# Patient Record
Sex: MALE | Race: WHITE | URBAN - METROPOLITAN AREA
[De-identification: names, ages, dates, MRNs, and addresses within clinical notes are randomized per-mention and may not be internally consistent; named-entity substitution may affect disease eponyms.]

---

## 2019-06-21 ENCOUNTER — THERAPY VISIT (OUTPATIENT)
Dept: PHYSICAL THERAPY | Facility: CLINIC | Age: 29
End: 2019-06-21
Payer: COMMERCIAL

## 2019-06-21 DIAGNOSIS — M54.50 ACUTE BILATERAL LOW BACK PAIN WITHOUT SCIATICA: ICD-10-CM

## 2019-06-21 PROCEDURE — 97112 NEUROMUSCULAR REEDUCATION: CPT | Mod: GP | Performed by: PHYSICAL THERAPIST

## 2019-06-21 PROCEDURE — 97161 PT EVAL LOW COMPLEX 20 MIN: CPT | Mod: GP | Performed by: PHYSICAL THERAPIST

## 2019-06-21 PROCEDURE — 97110 THERAPEUTIC EXERCISES: CPT | Mod: GP | Performed by: PHYSICAL THERAPIST
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
## 2019-06-21 NOTE — PROGRESS NOTES
[facility] Initial Evaluation  Subjective:       Pertinent medical history includes:  Smoking.    Surgeries include:  None.  Current medications:  None.   Primary job tasks include:  Driving, lifting/carrying, prolonged standing, pushing/pulling and repetitive tasks.           Patient is .         Oswestry Score: 22 %                 Objective:  System    Physical Exam    General     ROS    Assessment/Plan:

## 2019-07-16 ENCOUNTER — THERAPY VISIT (OUTPATIENT)
Dept: PHYSICAL THERAPY | Facility: CLINIC | Age: 29
End: 2019-07-16
Payer: COMMERCIAL

## 2019-07-16 DIAGNOSIS — M54.50 ACUTE BILATERAL LOW BACK PAIN WITHOUT SCIATICA: ICD-10-CM

## 2019-07-16 PROCEDURE — 97112 NEUROMUSCULAR REEDUCATION: CPT | Mod: GP | Performed by: PHYSICAL THERAPIST

## 2019-07-16 PROCEDURE — 97110 THERAPEUTIC EXERCISES: CPT | Mod: GP | Performed by: PHYSICAL THERAPIST
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
## 2019-08-05 ENCOUNTER — THERAPY VISIT (OUTPATIENT)
Dept: PHYSICAL THERAPY | Facility: CLINIC | Age: 29
End: 2019-08-05
Payer: COMMERCIAL

## 2019-08-05 DIAGNOSIS — M54.50 ACUTE BILATERAL LOW BACK PAIN WITHOUT SCIATICA: ICD-10-CM

## 2019-08-05 PROCEDURE — 97112 NEUROMUSCULAR REEDUCATION: CPT | Mod: GP | Performed by: PHYSICAL THERAPIST

## 2019-08-05 PROCEDURE — 97110 THERAPEUTIC EXERCISES: CPT | Mod: GP | Performed by: PHYSICAL THERAPIST

## 2019-09-19 PROBLEM — M54.50 ACUTE BILATERAL LOW BACK PAIN WITHOUT SCIATICA: Status: RESOLVED | Noted: 2019-06-21 | Resolved: 2019-09-19

## 2025-01-07 ENCOUNTER — OFFICE VISIT (OUTPATIENT)
Dept: URGENT CARE | Facility: URGENT CARE | Age: 35
End: 2025-01-07
Payer: COMMERCIAL

## 2025-01-07 VITALS
WEIGHT: 240 LBS | RESPIRATION RATE: 16 BRPM | SYSTOLIC BLOOD PRESSURE: 113 MMHG | DIASTOLIC BLOOD PRESSURE: 78 MMHG | OXYGEN SATURATION: 96 % | TEMPERATURE: 97.7 F | HEART RATE: 79 BPM

## 2025-01-07 DIAGNOSIS — M54.41 ACUTE RIGHT-SIDED LOW BACK PAIN WITH RIGHT-SIDED SCIATICA: Primary | ICD-10-CM

## 2025-01-07 PROCEDURE — 99203 OFFICE O/P NEW LOW 30 MIN: CPT | Performed by: PHYSICIAN ASSISTANT

## 2025-01-07 RX ORDER — METHOCARBAMOL 500 MG/1
500 TABLET, FILM COATED ORAL 4 TIMES DAILY PRN
Qty: 15 TABLET | Refills: 0 | Status: SHIPPED | OUTPATIENT
Start: 2025-01-07

## 2025-01-07 RX ORDER — PREDNISONE 20 MG/1
40 TABLET ORAL DAILY
Qty: 10 TABLET | Refills: 0 | Status: SHIPPED | OUTPATIENT
Start: 2025-01-07 | End: 2025-01-12

## 2025-01-07 ASSESSMENT — ENCOUNTER SYMPTOMS
BACK PAIN: 1
JOINT SWELLING: 0
COLOR CHANGE: 0
ARTHRALGIAS: 1
CHILLS: 0
FATIGUE: 0
NECK PAIN: 0
FEVER: 0
NECK STIFFNESS: 0
MYALGIAS: 1
CARDIOVASCULAR NEGATIVE: 1
WOUND: 0
PALPITATIONS: 0
NUMBNESS: 0

## 2025-01-07 ASSESSMENT — PAIN SCALES - GENERAL: PAINLEVEL_OUTOF10: EXTREME PAIN (8)

## 2025-01-08 ENCOUNTER — PATIENT OUTREACH (OUTPATIENT)
Dept: CARE COORDINATION | Facility: CLINIC | Age: 35
End: 2025-01-08
Payer: COMMERCIAL
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
## 2025-01-21 ENCOUNTER — OFFICE VISIT (OUTPATIENT)
Dept: PHYSICAL MEDICINE AND REHAB | Facility: CLINIC | Age: 35
End: 2025-01-21
Attending: PHYSICIAN ASSISTANT
Payer: COMMERCIAL

## 2025-01-21 VITALS
WEIGHT: 240 LBS | SYSTOLIC BLOOD PRESSURE: 137 MMHG | BODY MASS INDEX: 29.84 KG/M2 | HEIGHT: 75 IN | HEART RATE: 81 BPM | DIASTOLIC BLOOD PRESSURE: 84 MMHG

## 2025-01-21 DIAGNOSIS — M54.41 ACUTE RIGHT-SIDED LOW BACK PAIN WITH RIGHT-SIDED SCIATICA: Primary | ICD-10-CM

## 2025-01-21 DIAGNOSIS — M54.16 RIGHT LUMBAR RADICULOPATHY: ICD-10-CM

## 2025-01-21 DIAGNOSIS — G89.29 CHRONIC BILATERAL LOW BACK PAIN WITH BILATERAL SCIATICA: ICD-10-CM

## 2025-01-21 DIAGNOSIS — M54.41 CHRONIC BILATERAL LOW BACK PAIN WITH BILATERAL SCIATICA: ICD-10-CM

## 2025-01-21 DIAGNOSIS — M51.362 DEGENERATION OF INTERVERTEBRAL DISC OF LUMBAR REGION WITH DISCOGENIC BACK PAIN AND LOWER EXTREMITY PAIN: ICD-10-CM

## 2025-01-21 DIAGNOSIS — M54.42 CHRONIC BILATERAL LOW BACK PAIN WITH BILATERAL SCIATICA: ICD-10-CM

## 2025-01-21 PROCEDURE — 99204 OFFICE O/P NEW MOD 45 MIN: CPT | Performed by: NURSE PRACTITIONER

## 2025-01-21 RX ORDER — NABUMETONE 500 MG/1
500-1000 TABLET, FILM COATED ORAL 2 TIMES DAILY PRN
Qty: 30 TABLET | Refills: 3 | Status: SHIPPED | OUTPATIENT
Start: 2025-01-21

## 2025-01-21 ASSESSMENT — PAIN SCALES - GENERAL: PAINLEVEL_OUTOF10: MILD PAIN (2)

## 2025-01-21 NOTE — PROGRESS NOTES
ASSESSMENT: Jewel Ott is a 34 year old male who presents for consultation at the request of PCP Clinic - [facility], who presents today for new patient evaluation of:    -Ongoing right low back pain radiating to the right lower extremity L5 and S1 dermatomal pattern after lifting injury 1/6/2025.    -History of chronic low back pain with leg pain, history of conservative treatment with physical therapy and epidurals in the past.      -Perceived bilateral hand intermittent weakness.  Reflexes upper and lower extremities within normal limits and negative Doris reflex.    Patient is neurologically intact on exam. No myelopathic or red flag symptoms.         8/5/2019     7:00 AM   OSWESTRY DISABILITY INDEX   Count 10   Sum 5   Oswestry Score (%) 10 %            Diagnoses and all orders for this visit:  Right lumbar radiculopathy  -     nabumetone (RELAFEN) 500 MG tablet; Take 1-2 tablets (500-1,000 mg) by mouth 2 times daily as needed for moderate pain. Take with food.  Acute right-sided low back pain with right-sided sciatica  -     Spine  Referral    PLAN:  Reviewed spine anatomy and disease process. Discussed diagnosis and treatment options with the patient today. A shared decision making model was used.  The patient's values and choices were respected. The following represents what was discussed and decided upon by the provider and the patient.      -DIAGNOSTIC TESTS:  Images were personally reviewed and interpreted and explained to patient today using spine model.   -- Ordered updated lumbar spine MRI to further evaluate acute right lumbar radiculopathy.  No benefit with oral Medrol Dosepak/methocarbamol/chiropractic care.  --CT abdomen pelvis 8/13/2024    -PHYSICAL THERAPY: Referral to physical therapy placed to address home exercises for lumbar core strengthening and nerve glides.  Discussed the importance of core strengthening, ROM, stretching exercises with the patient  and how each of these entities is important in decreasing pain.  Explained to the patient that the purpose of physical therapy is to teach the patient a home exercise program.  These exercises need to be performed every day in order to decrease pain and prevent future occurrences of pain.        -MEDICATIONS: Prescribe nabumetone 500 mg 1 to 2 tablets twice daily for pain and inflammation.  Advised patient to avoid OTC NSAIDs while taking this medication and take it with a full glass of water and food.  Did offer another muscle relaxant, patient deferred this today.  Discussed multiple medication options today with patient. Discussed risks, side effects, and proper use of medications. Patient verbalized understanding.    -INTERVENTIONS: Discussed the possibility of potential RIGHT lumbar HERBERT pending imaging review which he would be open to.  Discussed risks and benefits of injections with patient today.    -PATIENT EDUCATION:  Total time of 46 minutes, on the day of service, spent with the patient, reviewing the chart, placing orders, and documenting.     -FOLLOW-UP:   Follow-up nurse call for imaging results and potential injection plan if medication/PT is not helping    Advised patient to call the Spine Center if symptoms worsen or you have problems controlling bladder and bowel function.   ______________________________________________________________________    SUBJECTIVE:  HPI:  Jewel Ott  Is a 34 year old male who presents today for new patient evaluation of low back pain right lower lumbar spine that radiates to the right buttock and into posterior lateral thigh mostly that stops at the knee.  Acute onset started after lifting a refrigerator 1/6/2025 at work, he is self-employed.  Since then pain has been quite bothersome, slight improvement with oral Medrol Dosepak.  He currently denies any episodes of his legs giving out on him.  Denies recent trips or falls or balance changes.    He does report  history of neck pain that is very minimal and tolerable at this time.  Reports occasional issues with dropping objects in his hands which he wanted to bring up today as well.  Is not his biggest concern but something that he has been frustrated by.  Discussed that we could consider cervical MRI down the road, he would like to hold off on this currently.    -Treatment to Date: No prior spinal surgery.  Recent chiropractic care January 2024 with minimal lasting benefit, no aggravation  Physical therapy years ago, nothing recent    History of injections at [facility] pain clinic years ago.    -Medications:  Methocarbamol without benefit    Medrol Dosepak 1/7/2025, with mild benefit only    Current Outpatient Medications   Medication Sig Dispense Refill    nabumetone (RELAFEN) 500 MG tablet Take 1-2 tablets (500-1,000 mg) by mouth 2 times daily as needed for moderate pain. Take with food. 30 tablet 3    methocarbamol (ROBAXIN) 500 MG tablet Take 1 tablet (500 mg) by mouth 4 times daily as needed for muscle spasms. (Patient not taking: Reported on 1/21/2025) 15 tablet 0     No current facility-administered medications for this visit.       No Known Allergies    No past medical history on file.     Patient Active Problem List   Diagnosis   (none) - all problems resolved or deleted       No past surgical history on file.    No family history on file.    Reviewed past medical, surgical, and family history with patient found on new patient intake packet located in EMR Media tab.     SOCIAL HX: Patient is , works as a  but is self-employed.  Patient does report tobacco use but only occasionally.  Reports occasional beer/alcohol use.  Denies history being a heavy drinker.  Denies recreational drug use.    ROS: Positive for sciatica.  Specifically negative for bowel/bladder dysfunction, balance changes, headache, dizziness, foot drop, fevers, chills, appetite changes, nausea/vomiting, unexplained weight loss.  "Otherwise 13 systems reviewed are negative. Please see the patient's intake questionnaire from today for details.    OBJECTIVE:  /84 (BP Location: Left arm, Patient Position: Sitting, Cuff Size: Adult Regular)   Pulse 81   Ht 6' 3\" (1.905 m)   Wt 240 lb (108.9 kg)   BMI 30.00 kg/m      PHYSICAL EXAMINATION:    --CONSTITUTIONAL:  Vital signs as above.  No acute distress.  The patient is well nourished and well groomed.  --PSYCHIATRIC:  Appropriate mood and affect. The patient is awake, alert, oriented to person, place, time and answering questions appropriately with clear speech.    --SKIN:  Skin over the face, bilateral lower extremities, and posterior torso is clean, dry, intact without rashes.    --RESPIRATORY: Normal rhythm and effort. No abnormal accessory muscle breathing patterns noted.   --STANDING EXAMINATION:  Normal lumbar lordosis noted, no lateral shift.  --MUSCULOSKELETAL: Range of motion is not limited in lumbar flexion, extension, lateral rotation. No tenderness to palpation lumbar spine. Straight leg raising is negative to radicular pain. Sciatic notch non-tender.  --GROSS MOTOR: Gait is non-antalgic. Easily arises from a seated position.   --LOWER EXTREMITY MOTOR TESTING:  Plantar flexion left 5/5, right 5/5   Dorsiflexion left 5/5, right 5/5   Great toe MTP extension left 5/5, right 5/5  Knee flexion left 5/5, right 5/5  Knee extension left 5/5, right 5/5   Hip flexion left 5/5, right 5/5  Hip abduction left 5/5, right 5/5  Hip adduction left 5/5, right 5/5   --HIPS: Full range of motion bilaterally.   --NEUROLOGICAL:  2/4 patellar, medial hamstring, and achilles reflexes bilaterally.  Sensation to light touch is intact in the bilateral L4, L5, and S1 dermatomes.  --VASCULAR:  Bilateral lower extremities are warm.  There is no pitting edema of the bilateral lower extremities.  CERVICAL:   --NEUROLOGIC:  CN III-XII are grossly intact.  2/4 symmetric biceps, brachioradialis, triceps " reflexes bilaterally.  Sensation to upper extremities is intact.  Negative Olivarez's bilaterally.    --VASCULAR:  Warm upper limbs bilaterally.    --CERVICAL SPINE: Inspection reveals no evidence of deformity. Range of motion is not limited in cervical flexion, extension, or lateral rotation.   RESULTS: Prior medical records from [facility] Everywhere were reviewed today.    Imaging: Spine imaging was personally reviewed and interpreted today. The images were shown to the patient and the findings were explained using a spine model.      Lumbar CT abdomen pelvis reviewed from 2024.

## 2025-01-21 NOTE — LETTER
1/21/2025      Jewel Ott  [address]  [place] [address]      Dear Colleague,    Thank you for referring your patient, Jewel Ott, to the [facility]. Please see a copy of my visit note below.    ASSESSMENT: Jewel Ott is a 34 year old male who presents [facility] consultation [facility] for new patient evaluation of:    -Ongoing right low back pain radiating to the right lower extremity L5 and S1 dermatomal pattern after lifting injury 1/6/2025.    -History of chronic low back pain with leg pain, history of conservative treatment with physical therapy and epidurals in the past.      -Perceived bilateral hand intermittent weakness.  Reflexes upper and lower extremities within normal limits and negative Doris reflex.    Patient is neurologically intact on exam. No myelopathic or red flag symptoms.         8/5/2019     7:00 AM   OSWESTRY DISABILITY INDEX   Count 10   Sum 5   Oswestry Score (%) 10 %            Diagnoses and all orders for this visit:  Right lumbar radiculopathy  -     nabumetone (RELAFEN) 500 MG tablet; Take 1-2 tablets (500-1,000 mg) by mouth 2 times daily as needed for moderate pain. Take with food.  Acute right-sided low back pain with right-sided sciatica  -     Spine  Referral    PLAN:  Reviewed spine anatomy and disease process. Discussed diagnosis and treatment options with the patient today. A shared decision making model was used.  The patient's values and choices were respected. The following represents what was discussed and decided upon by the provider and the patient.      -DIAGNOSTIC TESTS:  Images were personally reviewed and interpreted and explained to patient today using spine model.   -- Ordered updated lumbar spine MRI to further evaluate acute right lumbar radiculopathy.  No benefit with oral Medrol Dosepak/methocarbamol/chiropractic care.  --CT abdomen  pelvis 8/13/2024    -PHYSICAL THERAPY: Referral to physical therapy placed to address home exercises for lumbar core strengthening and nerve glides.  Discussed the importance of core strengthening, ROM, stretching exercises with the patient and how each of these entities is important in decreasing pain.  Explained to the patient that the purpose of physical therapy is to teach the patient a home exercise program.  These exercises need to be performed every day in order to decrease pain and prevent future occurrences of pain.        -MEDICATIONS: Prescribe nabumetone 500 mg 1 to 2 tablets twice daily for pain and inflammation.  Advised patient to avoid OTC NSAIDs while taking this medication and take it with a full glass of water and food.  Did offer another muscle relaxant, patient deferred this today.  Discussed multiple medication options today with patient. Discussed risks, side effects, and proper use of medications. Patient verbalized understanding.    -INTERVENTIONS: Discussed the possibility of potential RIGHT lumbar HERBERT pending imaging review which he would be open to.  Discussed risks and benefits of injections with patient today.    -PATIENT EDUCATION:  Total time of 46 minutes, on the day of service, spent with the patient, reviewing the chart, placing orders, and documenting.     -FOLLOW-UP:   Follow-up nurse call for imaging results and potential injection plan if medication/PT is not helping    Advised patient to call the Spine Center if symptoms worsen or you have problems controlling bladder and bowel function.   ______________________________________________________________________    SUBJECTIVE:  HPI:  Jewel Ott  Is a 34 year old male who presents today for new patient evaluation of low back pain right lower lumbar spine that radiates to the right buttock and into posterior lateral thigh mostly that stops at the knee.  Acute onset started after lifting a refrigerator 1/6/2025 at work, he is  self-employed.  Since then pain has been quite bothersome, slight improvement with oral Medrol Dosepak.  He currently denies any episodes of his legs giving out on him.  Denies recent trips or falls or balance changes.    He does report history of neck pain that is very minimal and tolerable at this time.  Reports occasional issues with dropping objects in his hands which he wanted to bring up today as well.  Is not his biggest concern but something that he has been frustrated by.  Discussed that we could consider cervical MRI down the road, he would like to hold off on this currently.    -Treatment to Date: No prior spinal surgery.  Recent chiropractic care January 2024 with minimal lasting benefit, no aggravation  Physical therapy years ago, nothing recent    History of injections at [facility] pain clinic years ago.    -Medications:  Methocarbamol without benefit    Medrol Dosepak 1/7/2025, with mild benefit only    Current Outpatient Medications   Medication Sig Dispense Refill     nabumetone (RELAFEN) 500 MG tablet Take 1-2 tablets (500-1,000 mg) by mouth 2 times daily as needed for moderate pain. Take with food. 30 tablet 3     methocarbamol (ROBAXIN) 500 MG tablet Take 1 tablet (500 mg) by mouth 4 times daily as needed for muscle spasms. (Patient not taking: Reported on 1/21/2025) 15 tablet 0     No current facility-administered medications for this visit.       No Known Allergies    No past medical history on file.     Patient Active Problem List   Diagnosis   (none) - all problems resolved or deleted       No past surgical history on file.    No family history on file.    Reviewed past medical, surgical, and family history with patient found on new patient intake packet located in EMR Media tab.     SOCIAL HX: Patient is , works as a  but is self-employed.  Patient does report tobacco use but only occasionally.  Reports occasional beer/alcohol use.  Denies history being a heavy drinker.   "Denies recreational drug use.    ROS: Positive for sciatica.  Specifically negative for bowel/bladder dysfunction, balance changes, headache, dizziness, foot drop, fevers, chills, appetite changes, nausea/vomiting, unexplained weight loss. Otherwise 13 systems reviewed are negative. Please see the patient's intake questionnaire from today for details.    OBJECTIVE:  /84 (BP Location: Left arm, Patient Position: Sitting, Cuff Size: Adult Regular)   Pulse 81   Ht 6' 3\" (1.905 m)   Wt 240 lb (108.9 kg)   BMI 30.00 kg/m      PHYSICAL EXAMINATION:    --CONSTITUTIONAL:  Vital signs as above.  No acute distress.  The patient is well nourished and well groomed.  --PSYCHIATRIC:  Appropriate mood and affect. The patient is awake, alert, oriented to person, place, time and answering questions appropriately with clear speech.    --SKIN:  Skin over the face, bilateral lower extremities, and posterior torso is clean, dry, intact without rashes.    --RESPIRATORY: Normal rhythm and effort. No abnormal accessory muscle breathing patterns noted.   --STANDING EXAMINATION:  Normal lumbar lordosis noted, no lateral shift.  --MUSCULOSKELETAL: Range of motion is not limited in lumbar flexion, extension, lateral rotation. No tenderness to palpation lumbar spine. Straight leg raising is negative to radicular pain. Sciatic notch non-tender.  --GROSS MOTOR: Gait is non-antalgic. Easily arises from a seated position.   --LOWER EXTREMITY MOTOR TESTING:  Plantar flexion left 5/5, right 5/5   Dorsiflexion left 5/5, right 5/5   Great toe MTP extension left 5/5, right 5/5  Knee flexion left 5/5, right 5/5  Knee extension left 5/5, right 5/5   Hip flexion left 5/5, right 5/5  Hip abduction left 5/5, right 5/5  Hip adduction left 5/5, right 5/5   --HIPS: Full range of motion bilaterally.   --NEUROLOGICAL:  2/4 patellar, medial hamstring, and achilles reflexes bilaterally.  Sensation to light touch is intact in the bilateral L4, L5, and S1 " dermatomes.  --VASCULAR:  Bilateral lower extremities are warm.  There is no pitting edema of the bilateral lower extremities.  CERVICAL:   --NEUROLOGIC:  CN III-XII are grossly intact.  2/4 symmetric biceps, brachioradialis, triceps reflexes bilaterally.  Sensation to upper extremities is intact.  Negative Olivarez's bilaterally.    --VASCULAR:  Warm upper limbs bilaterally.    --CERVICAL SPINE: Inspection reveals no evidence of deformity. Range of motion is not limited in cervical flexion, extension, or lateral rotation.   RESULTS: Prior medical records from [facility]where were reviewed today.    Imaging: Spine imaging was personally reviewed and interpreted today. The images were shown to the patient and the findings were explained using a spine model.      Lumbar CT abdomen pelvis reviewed from 2024.                     Again, thank you for allowing me to participate in the care of your patient.        Sincerely,        Alexa Gonzalez CNP    Electronically signed

## 2025-01-21 NOTE — PATIENT INSTRUCTIONS
~Spine Center Scheduling #[phone].  ~Please call our [facility] Nurse Navigation #[phone] with any questions or concerns about your treatment plan, if symptoms worsen and you would like to be seen urgently, or if you have problems controlling bladder and bowel function.  ~For any future flareups or new symptoms, recommend follow-up in clinic or contact the nurse navigator line.  ~Please note that any My Chart messages may take multiple days for a response due to the high volume of patients seen in clinic.  Anything sent Thursday night or after will be answered the following week when able, as Alexa Gonzalez CNP does not work in clinic on Fridays.   ~Alexa Gonzalez CNP is at the [facility] on Tuesdays, otherwise primarily at the [facility].       Prescribed nabumetone today. Please take as prescribed, as needed for pain control as well as to aid in decreasing inflammation. Take medication with a full glass of water and food.   *Do not take Advil, Ibuprofen, Aleve, or Naproxen while taking this medication as it can cause organ failure if taken together*  This medication does have risks if taken long term, these risks include: gastrointestinal irritation, kidney dysfunction, and cardiovascular effects.       ~You have been referred for Physical Therapy to [facility]. They will call you to schedule an appointment.       Scheduling phone number is [phone] for [facility], [place], or [place] location.  If you have not heard from the scheduling office within 2 business days, please call [phone] for ALL other locations.     Discussed the importance of core strengthening, ROM, stretching exercises and how each of these entities is important in decreasing pain and improving long term spine health.  The purpose of physical therapy is to teach you an individualized home exercise program.  These exercises need to be performed  every day in order to decrease pain and prevent future occurrences of pain.            Imaging has been ordered. Radiology will call you to schedule. Please call below if you do not hear from them in the next couple of days.     [facility] Scheduling    Please call [phone] to schedule your image(s) (select option #1). There are 3 different locations near, see below.   There are imaging options for other locations as well, the imaging schedulers can help with other locations within [place].     [facility]  [address] Imaging - [place]  [address] [facility], [address]   [place] [id number]    [facility]  [address] [place] [address]